# Patient Record
Sex: MALE | Race: WHITE | URBAN - METROPOLITAN AREA
[De-identification: names, ages, dates, MRNs, and addresses within clinical notes are randomized per-mention and may not be internally consistent; named-entity substitution may affect disease eponyms.]

---

## 2018-11-29 DIAGNOSIS — R10.9 FLANK PAIN: Primary | ICD-10-CM

## 2018-12-03 ENCOUNTER — OFFICE VISIT (OUTPATIENT)
Dept: UROLOGY | Facility: CLINIC | Age: 63
End: 2018-12-03
Payer: MEDICARE

## 2018-12-03 VITALS
BODY MASS INDEX: 28.79 KG/M2 | DIASTOLIC BLOOD PRESSURE: 80 MMHG | OXYGEN SATURATION: 98 % | HEIGHT: 68 IN | SYSTOLIC BLOOD PRESSURE: 120 MMHG | WEIGHT: 190 LBS | HEART RATE: 73 BPM

## 2018-12-03 DIAGNOSIS — R10.9 RIGHT FLANK PAIN: Primary | ICD-10-CM

## 2018-12-03 PROCEDURE — 99203 OFFICE O/P NEW LOW 30 MIN: CPT | Performed by: PHYSICIAN ASSISTANT

## 2018-12-03 RX ORDER — LORATADINE 10 MG/1
10 TABLET ORAL DAILY
COMMUNITY

## 2018-12-03 RX ORDER — CYCLOSPORINE 0.5 MG/ML
1 EMULSION OPHTHALMIC 2 TIMES DAILY
COMMUNITY

## 2018-12-03 ASSESSMENT — PAIN SCALES - GENERAL: PAINLEVEL: NO PAIN (0)

## 2018-12-03 NOTE — PROGRESS NOTES
"CC: Flank pain.    HPI: It is a pleasure to see Channing Bustamante, a pleasant 63 year old year old seen today in the urology clinic in consultation via self referral for evaluation of the above. This was first detected the past 1-2 weeks.  The flank pain is currently mild and intermittent.  States he has an enlarged prostate but has no voiding concerns today.     Denies gross hematuria, dysuria, fevers, chills, N/V. Has prior history of kidney stones x 2. Son has had several stone episodes.     RECENT IMAGING:  None    Past Medical History:   Diagnosis Date     Kidney stones      Post corneal transplant 2008       Past Surgical History:   Procedure Laterality Date     corneal transplant      left eye     EYE SURGERY      FUCHS DISEASE; CORNEAL TRANSPLANT     HERNIORRHAPHY UMBILICAL  12/5/2011    Procedure:HERNIORRHAPHY UMBILICAL; Surgeon:PHILIP CAVANAUGH; Location:[facility] OR     LAPAROSCOPIC CHOLECYSTECTOMY  12/5/2011    Procedure:LAPAROSCOPIC CHOLECYSTECTOMY; LAPAROSCOPIC CHOLECYSTECTOMY, Umbilical Hernia Repair ; Surgeon:PHILIP CAVANAUGH; Location:[facility] OR     LASER HOLMIUM LITHOTRIPSY URETER(S), INSERT STENT, COMBINED       VASECTOMY          No Known Allergies    Cannot display prior to admission medications because the patient has not been admitted in this contact.       FAMILY HISTORY: There is no family h/o  malignancy.  There is family h/o urolithiasis.     SOCIAL HISTORY: The patient does not smoke cigarettes, Denies EtOH and illicit drug use.     ROS: A comprehensive 14 point ROS was obtained and otherwise negative except for that outlined above in the HPI.    GENERAL PHYSICAL EXAM:   /80 (BP Location: Left arm, Patient Position: Sitting, Cuff Size: Adult Regular)  Pulse 73  Ht 1.727 m (5' 8\")  Wt 86.2 kg (190 lb)  SpO2 98%  BMI 28.89 kg/m2  GEN: NAD  EYES: EOMI  MOUTH: MMM  NECK: Supple  RESP: Unlabored breathing  CARDIAC: No LE edema  SKIN: Warm  ABD: soft  NEURO: AAO    UA today: unable      ASSESSMENT " AND PLAN: Channing Bustamante is a very pleasant 63 year old year old with flank pain    -CT w/o to evaluate the pain, will call pt with results and recommended f/u.  - Warning signs discussed including fevers, chills, N/V, gross hematuria, severe pain that is refractory to medications which should prompt more urgent evaluation. Patient understands to proceed to the ER should these warning signs occur outside of clinic hours.    I have enjoyed participating in the medical care of this very pleasant patient and will continue to keep you updated on her progress.  Please don't hesitate to contact me with any questions or concerns.      Carlie Rendon PA-C  [facility]    20 minutes were spent with the patient today, > 50% in counseling and coordination of care for flank pain.

## 2018-12-03 NOTE — NURSING NOTE
Chief Complaint   Patient presents with     Flank Pain     Pt with hx of stone here due to Flank pain      Elsy Carr CMA

## 2018-12-03 NOTE — LETTER
"12/3/2018       RE: Channing Bustamante  [address] [place] [address]     Dear Colleague,    Thank you for referring your patient, Channing Bustamante, to the [facility] [place] at [facility]. Please see a copy of my visit note below.    CC: Flank pain.    HPI: It is a pleasure to see Channing Bustamante, a pleasant 63 year old year old seen today in the urology clinic in consultation via self referral for evaluation of the above. This was first detected the past 1-2 weeks.  The flank pain is currently mild and intermittent.  States he has an enlarged prostate but has no voiding concerns today.     Denies gross hematuria, dysuria, fevers, chills, N/V. Has prior history of kidney stones x 2. Son has had several stone episodes.     RECENT IMAGING:  None    Past Medical History:   Diagnosis Date     Kidney stones      Post corneal transplant 2008       Past Surgical History:   Procedure Laterality Date     corneal transplant      left eye     EYE SURGERY      FUCHS DISEASE; CORNEAL TRANSPLANT     HERNIORRHAPHY UMBILICAL  12/5/2011    Procedure:HERNIORRHAPHY UMBILICAL; Surgeon:PHILIP CAVANAUGH; Location:[facility] OR     LAPAROSCOPIC CHOLECYSTECTOMY  12/5/2011    Procedure:LAPAROSCOPIC CHOLECYSTECTOMY; LAPAROSCOPIC CHOLECYSTECTOMY, Umbilical Hernia Repair ; Surgeon:PHILIP CAVANAUGH; Location:[facility] OR     LASER HOLMIUM LITHOTRIPSY URETER(S), INSERT STENT, COMBINED       VASECTOMY          No Known Allergies    Cannot display prior to admission medications because the patient has not been admitted in this contact.       FAMILY HISTORY: There is no family h/o  malignancy.  There is family h/o urolithiasis.     SOCIAL HISTORY: The patient does not smoke cigarettes, Denies EtOH and illicit drug use.     GENERAL PHYSICAL EXAM:   /80 (BP Location: Left arm, Patient Position: Sitting, Cuff Size: Adult Regular)  Pulse 73  Ht 1.727 m (5' 8\")  Wt 86.2 kg (190 lb)  SpO2 " 98%  BMI 28.89 kg/m2  GEN: NAD  EYES: EOMI  MOUTH: MMM  NECK: Supple  RESP: Unlabored breathing  CARDIAC: No LE edema  SKIN: Warm  ABD: soft  NEURO: AAO    UA today: unable    ASSESSMENT AND PLAN: Channing Bustamante is a very pleasant 63 year old year old with flank pain    -CT w/o to evaluate the pain, will call pt with results and recommended f/u.  - Warning signs discussed including fevers, chills, N/V, gross hematuria, severe pain that is refractory to medications which should prompt more urgent evaluation. Patient understands to proceed to the ER should these warning signs occur outside of clinic hours.    I have enjoyed participating in the medical care of this very pleasant patient and will continue to keep you updated on her progress.  Please don't hesitate to contact me with any questions or concerns.      Carlie Rendon PA-C  [facility]    20 minutes were spent with the patient today, > 50% in counseling and coordination of care for flank pain.

## 2018-12-03 NOTE — MR AVS SNAPSHOT
"              After Visit Summary   12/3/2018    Channing Bustamante    MRN: [id number]           Patient Information     Date Of Birth          1955        Visit Information        Provider Department      12/3/2018 3:00 PM Carlie Rendon PA-C [facility] [place]        Today's Diagnoses     Right flank pain    -  1      Care Instructions    - CT scan: Please call [phone] to schedule this at the [facility] ([place]).             Follow-ups after your visit        Future tests that were ordered for you today     Open Future Orders        Priority Expected Expires Ordered    CT Abdomen Pelvis w/o Contrast [JGU144] Routine  12/3/2019 12/3/2018            Who to contact     If you have questions or need follow up information about today's clinic visit or your schedule please contact [facility] [place] directly at [phone].  Normal or non-critical lab and imaging results will be communicated to you by [employer], letter or phone within 4 business days after the clinic has received the results. If you do not hear from us within 7 days, please contact the clinic through [employer] or phone. If you have a critical or abnormal lab result, we will notify you by phone as soon as possible.  Submit refill requests through [employer] or call your pharmacy and they will forward the refill request to us. Please allow 3 business days for your refill to be completed.          Additional Information About Your Visit        MyChart Information     [employer] lets you send messages to your doctor, view your test results, renew your prescriptions, schedule appointments and more. To sign up, go to [email or web address] . Click on \"Log in\" on the left side of the screen, which will take you to the Welcome page. Then click on \"Sign up Now\" on the right side of the page.     You will be asked to enter the access code listed below, as well as " "some personal information. Please follow the directions to create your username and password.     Your access code is: [email or web address]  Expires: 3/3/2019  3:19 PM     Your access code will  in 90 days. If you need help or a new code, please call your [facility] clinic or [phone].        Care EveryWhere ID     This is your Care EveryWhere ID. This could be used by other organizations to access your [facility] medical records  [id number]        Your Vitals Were     Pulse Height Pulse Oximetry BMI (Body Mass Index)          73 1.727 m (5' 8\") 98% 28.89 kg/m2         Blood Pressure from Last 3 Encounters:   18 120/80   11 129/79   11 143/89    Weight from Last 3 Encounters:   18 86.2 kg (190 lb)   11 93 kg (205 lb 0.4 oz)   11 90.7 kg (200 lb)               Primary Care Provider Office Phone # Fax #    Brigitte Rushing [phone]       [facility] [address]  [facility] [address]        Equal Access to Services     CHANCE CELIS AH: Hadii deni odell hadasho Somiriamali, waaxda luqadaha, qaybta kaalmada adeegyada, ngozi ocampo. So [facility] [phone].    ATENCIÓN: Si habla español, tiene a kidd disposición servicios gratuitos de asistencia lingüística. [facility] [phone].    We comply with applicable federal civil rights laws and [place] laws. We do not discriminate on the basis of race, color, national origin, age, disability, sex, sexual orientation, or gender identity.            Thank you!     Thank you for choosing [facility]  for your care. Our goal is always to provide you with excellent care. Hearing back from our patients is one way we can continue to improve our services. Please take a few minutes to complete the written survey that you may receive in the mail after your visit with us. Thank you!             Your Updated Medication List - Protect others around you: Learn how to safely use, " store and throw away your medicines at www.disposemymeds.org.          This list is accurate as of 12/3/18  3:19 PM.  Always use your most recent med list.                   Brand Name Dispense Instructions for use Diagnosis    aspirin 81 MG tablet    ASA     Take 1 tablet by mouth daily.        cycloSPORINE 0.05 % ophthalmic emulsion    RESTASIS     1 drop 2 times daily        loratadine 10 MG tablet    CLARITIN     Take 10 mg by mouth daily        loteprednol 0.2 % Susp ophthalmic susp    LOTEMAX/ALREX     1 drop 4 times daily.        oxyCODONE 5 MG tablet    ROXICODONE    30 tablet    Take 1-2 tablets by mouth every 3 hours as needed for other (Moderate to Severe Pain).    Cholecystitis, Hernia, umbilical       oxyCODONE-acetaminophen 5-325 MG tablet    PERCOCET    20 tablet    Take 1-2 tablets by mouth every 6 hours as needed for pain.        timolol maleate 0.5 % opthalmic solution    ISTALOL     Apply  to eye.

## 2018-12-03 NOTE — PATIENT INSTRUCTIONS
- CT scan: Please call [phone] to schedule this at the [facility] ([place]).

## 2018-12-13 ENCOUNTER — HOSPITAL ENCOUNTER (OUTPATIENT)
Dept: CT IMAGING | Facility: CLINIC | Age: 63
Discharge: HOME | End: 2018-12-13
Attending: PHYSICIAN ASSISTANT | Admitting: PHYSICIAN ASSISTANT
Payer: MEDICARE

## 2018-12-13 DIAGNOSIS — R10.9 RIGHT FLANK PAIN: ICD-10-CM

## 2018-12-13 PROCEDURE — 74176 CT ABD & PELVIS W/O CONTRAST: CPT

## 2019-10-04 ENCOUNTER — HEALTH MAINTENANCE LETTER (OUTPATIENT)
Age: 64
End: 2019-10-04

## 2020-02-08 ENCOUNTER — HEALTH MAINTENANCE LETTER (OUTPATIENT)
Age: 65
End: 2020-02-08

## 2020-11-08 ENCOUNTER — HEALTH MAINTENANCE LETTER (OUTPATIENT)
Age: 65
End: 2020-11-08

## 2021-03-28 ENCOUNTER — HEALTH MAINTENANCE LETTER (OUTPATIENT)
Age: 66
End: 2021-03-28

## 2021-09-11 ENCOUNTER — HEALTH MAINTENANCE LETTER (OUTPATIENT)
Age: 66
End: 2021-09-11

## 2022-04-23 ENCOUNTER — HEALTH MAINTENANCE LETTER (OUTPATIENT)
Age: 67
End: 2022-04-23

## 2022-10-30 ENCOUNTER — HEALTH MAINTENANCE LETTER (OUTPATIENT)
Age: 67
End: 2022-10-30

## 2023-06-01 ENCOUNTER — HEALTH MAINTENANCE LETTER (OUTPATIENT)
Age: 68
End: 2023-06-01